# Patient Record
Sex: FEMALE | Race: ASIAN | Employment: OTHER
[De-identification: names, ages, dates, MRNs, and addresses within clinical notes are randomized per-mention and may not be internally consistent; named-entity substitution may affect disease eponyms.]

---

## 2017-08-12 ENCOUNTER — HEALTH MAINTENANCE LETTER (OUTPATIENT)
Age: 27
End: 2017-08-12

## 2022-03-07 ENCOUNTER — APPOINTMENT (OUTPATIENT)
Dept: GENERAL RADIOLOGY | Facility: CLINIC | Age: 32
End: 2022-03-07
Attending: EMERGENCY MEDICINE
Payer: COMMERCIAL

## 2022-03-07 ENCOUNTER — HOSPITAL ENCOUNTER (EMERGENCY)
Facility: CLINIC | Age: 32
Discharge: HOME | End: 2022-03-07
Attending: EMERGENCY MEDICINE | Admitting: EMERGENCY MEDICINE
Payer: COMMERCIAL

## 2022-03-07 VITALS
RESPIRATION RATE: 17 BRPM | OXYGEN SATURATION: 99 % | WEIGHT: 169 LBS | TEMPERATURE: 98.2 F | HEART RATE: 90 BPM | DIASTOLIC BLOOD PRESSURE: 68 MMHG | SYSTOLIC BLOOD PRESSURE: 120 MMHG

## 2022-03-07 DIAGNOSIS — R00.2 PALPITATIONS: ICD-10-CM

## 2022-03-07 DIAGNOSIS — R07.89 CHEST PRESSURE: ICD-10-CM

## 2022-03-07 LAB
ANION GAP SERPL CALCULATED.3IONS-SCNC: 6 MMOL/L (ref 3–14)
BASOPHILS # BLD AUTO: 0 10E3/UL (ref 0–0.2)
BASOPHILS NFR BLD AUTO: 0 %
BUN SERPL-MCNC: 12 MG/DL (ref 7–30)
CALCIUM SERPL-MCNC: 9.2 MG/DL (ref 8.5–10.1)
CHLORIDE BLD-SCNC: 108 MMOL/L (ref 94–109)
CO2 SERPL-SCNC: 27 MMOL/L (ref 20–32)
CREAT SERPL-MCNC: 0.6 MG/DL (ref 0.52–1.04)
D DIMER PPP FEU-MCNC: <0.27 UG/ML FEU (ref 0–0.5)
EOSINOPHIL # BLD AUTO: 0 10E3/UL (ref 0–0.7)
EOSINOPHIL NFR BLD AUTO: 0 %
ERYTHROCYTE [DISTWIDTH] IN BLOOD BY AUTOMATED COUNT: 12 % (ref 10–15)
GFR SERPL CREATININE-BSD FRML MDRD: >90 ML/MIN/1.73M2
GLUCOSE BLD-MCNC: 109 MG/DL (ref 70–99)
HCG UR QL: NEGATIVE
HCT VFR BLD AUTO: 39.5 % (ref 35–47)
HGB BLD-MCNC: 13.7 G/DL (ref 11.7–15.7)
IMM GRANULOCYTES # BLD: 0.1 10E3/UL
IMM GRANULOCYTES NFR BLD: 1 %
LYMPHOCYTES # BLD AUTO: 1.5 10E3/UL (ref 0.8–5.3)
LYMPHOCYTES NFR BLD AUTO: 12 %
MCH RBC QN AUTO: 28.9 PG (ref 26.5–33)
MCHC RBC AUTO-ENTMCNC: 34.7 G/DL (ref 31.5–36.5)
MCV RBC AUTO: 83 FL (ref 78–100)
MONOCYTES # BLD AUTO: 0.8 10E3/UL (ref 0–1.3)
MONOCYTES NFR BLD AUTO: 6 %
NEUTROPHILS # BLD AUTO: 10.8 10E3/UL (ref 1.6–8.3)
NEUTROPHILS NFR BLD AUTO: 81 %
NRBC # BLD AUTO: 0 10E3/UL
NRBC BLD AUTO-RTO: 0 /100
PLATELET # BLD AUTO: 231 10E3/UL (ref 150–450)
POTASSIUM BLD-SCNC: 3.6 MMOL/L (ref 3.4–5.3)
RBC # BLD AUTO: 4.74 10E6/UL (ref 3.8–5.2)
SODIUM SERPL-SCNC: 141 MMOL/L (ref 133–144)
TROPONIN I SERPL HS-MCNC: 4 NG/L
WBC # BLD AUTO: 13.2 10E3/UL (ref 4–11)

## 2022-03-07 PROCEDURE — 85025 COMPLETE CBC W/AUTO DIFF WBC: CPT | Performed by: EMERGENCY MEDICINE

## 2022-03-07 PROCEDURE — 99285 EMERGENCY DEPT VISIT HI MDM: CPT | Performed by: EMERGENCY MEDICINE

## 2022-03-07 PROCEDURE — 85379 FIBRIN DEGRADATION QUANT: CPT | Performed by: EMERGENCY MEDICINE

## 2022-03-07 PROCEDURE — 84484 ASSAY OF TROPONIN QUANT: CPT | Performed by: EMERGENCY MEDICINE

## 2022-03-07 PROCEDURE — 71046 X-RAY EXAM CHEST 2 VIEWS: CPT

## 2022-03-07 PROCEDURE — 80048 BASIC METABOLIC PNL TOTAL CA: CPT | Performed by: EMERGENCY MEDICINE

## 2022-03-07 PROCEDURE — 250N000013 HC RX MED GY IP 250 OP 250 PS 637: Performed by: EMERGENCY MEDICINE

## 2022-03-07 PROCEDURE — 99285 EMERGENCY DEPT VISIT HI MDM: CPT | Mod: 25 | Performed by: EMERGENCY MEDICINE

## 2022-03-07 PROCEDURE — 36415 COLL VENOUS BLD VENIPUNCTURE: CPT | Performed by: EMERGENCY MEDICINE

## 2022-03-07 PROCEDURE — 81025 URINE PREGNANCY TEST: CPT | Performed by: EMERGENCY MEDICINE

## 2022-03-07 PROCEDURE — 93005 ELECTROCARDIOGRAM TRACING: CPT | Performed by: EMERGENCY MEDICINE

## 2022-03-07 RX ORDER — ASPIRIN 81 MG/1
324 TABLET, CHEWABLE ORAL ONCE
Status: COMPLETED | OUTPATIENT
Start: 2022-03-07 | End: 2022-03-07

## 2022-03-07 RX ADMIN — ASPIRIN 81 MG CHEWABLE TABLET 324 MG: 81 TABLET CHEWABLE at 22:25

## 2022-03-08 ENCOUNTER — PATIENT OUTREACH (OUTPATIENT)
Dept: FAMILY MEDICINE | Facility: CLINIC | Age: 32
End: 2022-03-08
Payer: COMMERCIAL

## 2022-03-08 NOTE — ED PROVIDER NOTES
"     [facility] EMERGENCY DEPARTMENT ([facility])  3/07/22    History     Chief Complaint   Patient presents with     Medication Reaction     1.5 weeks ago was started on Concerta, Tues went to the clininc \" due to have weird feeling in my chest, EKG was normal\" Last took Concerta 3 days ago. Today again having racing heartbeat, feeling numb in my hands, hard time concentrating     HPI  Madeleine Daly is a 32 year old female who presents to the ED for evaluation of palpitations and chest pressure.  Patient thinks that this may be related to starting Concerta 1.5 weeks ago.  She states she has not taken the Concerta for the past 3 days as well as ceasing caffeine use.  She states she has felt slightly better, but is still experiencing intermittent palpitations, chest pressure, and lightheadedness.  Patient states that she has also had some hand numbness, nausea, and shortness of breath earlier today, but is unsure if the shortness of breath may be related to being anxious.  She denies current shortness of breath, nausea, or numbness.  She denies vomiting.  Patient states she is still experiencing the chest pressure and describes it as a tightness.  She denies recent COVID-19 infection, fever, chills, or cough.  She denies recent immobilization, leg pain or swelling, or family history of early onset cardiac disease.  She denies personal history of hypertension or high cholesterol.  She denies being a smoker.  Patient does indicate that she has had IUD in place.  She denies prior similar incidents.  She denies any known modifying factors to her chest pressure.    Past Medical History  Past Medical History:   Diagnosis Date     Depressive disorder, not elsewhere classified      Past Surgical History:   Procedure Laterality Date     NO HISTORY OF SURGERY       ALLEGRA 180 MG OR TABS  MIDRIN 325- MG OR CAPS  oseltamivir (TAMIFLU) 75 MG capsule  Desogestrel-Ethinyl Estradiol (DESOGEN PO)      No Known " Allergies  Family History  Family History   Problem Relation Age of Onset     C.A.D. Paternal Grandmother      C.A.D. Paternal Uncle         heart transplant     Diabetes Maternal Aunt      Diabetes Maternal Grandmother         borderline     Hypertension Maternal Grandfather         great     Hypertension Maternal Grandmother      Cerebrovascular Disease Maternal Grandmother         great     Cerebrovascular Disease Maternal Grandmother      Breast Cancer No family hx of      Cancer - colorectal No family hx of      Social History   Social History     Tobacco Use     Smoking status: Passive Smoke Exposure - Never Smoker     Smokeless tobacco: Not on file     Tobacco comment: not real often   Substance Use Topics     Alcohol use: Yes     Comment: not very often     Drug use: No      Past medical history, past surgical history, medications, allergies, family history, and social history were reviewed with the patient. No additional pertinent items.       Review of Systems  A complete review of systems was performed with pertinent positives and negatives noted in the HPI, and all other systems negative.    Physical Exam   BP: 132/81  Pulse: 100  Temp: 98.3  F (36.8  C)  Resp: 16  Weight: 76.7 kg (169 lb)  SpO2: 100 %  Physical Exam  \General: Alert,  sitting on the bed in NAD   Neuro: awake alert moving extremities x 4 face symetrical   Head: atraumatic   Eyes: palpebral conjunctiva normal - not pale   Mouth/Throat: mucous membranes moist    Chest/Pulm: clear BS bilaterally, no chest wall tenderness to palpation   Cardiovascular: regular; S1 S2 no murmur;  cap refill < 2secs to the digits   Abdomen:  soft non-tender +BS   Extremities: no LE edema, no calf tenderness   Skin: non diaphoretic  warm and dry     ED Course      Procedures   9:54 PM  The patient was seen and examined by Kasie Cabral MD in Room ED02.      Nursing note were reviewed.  Past Medical records were reviewed.  Patient has no cardiac risk factors.   Differential Diagnosis for this patient's chest pain includes ischemic chest pain pulmonary embolism aortic dissection pneumonia esophageal rupture pericarditis chest wall pain or referred pain from biliary colic.      Soon after presentation to the ER patient was placed on a cardiac monitor, provided oxygen via nasal canula and an EKG was ordered which showed sinus rhythm with no signs of acute ischemia or infarction.  In addition, an Aspirin was  Given.       I do not believe the patient's pain represents a PE.  The Wells criteria score is low.  Thus a d-dimer was done and was negative. (Patient could not be ruled out by PERC Criteria.   I do not believe this patient has a thoracic aortic dissection as the pain is not ripping or tearing it does not go to her back.  This patient does not have a history of poorly controlled high blood pressure.   I do not believe that her chest pain is secondary to pneumonia as this patient does not have productive cough or fever.  The CXR did not demonstrate infiltrates.  I do not believe her pain is due to esophageal rupture as there was no preceding  forceful vomitting or retching and the CXR does not show mediastinal free air.   I do not believe it is due to pericarditis as there is no evidence of [place] depression on the electrocardiogram and the pain does not seem to be markedly changed with position.  I do not believe that this patient has referred pain from the ABD as her ABD exam is benign.  Patient's cardiac risk score is  low. at this point I believe the most likely source of this patient's pain is unclear.   Discussed findings with patient and that she should follow up closely in primary care clinic. Patient is in agreement with plan.    Patient discharged in good condition with questions answered. She was given epic discharge instructions and follow-up recommendations. Patient will return to the ED if symptoms worsen or She develops any of the concerning signs/symptoms as  outlined in the EPIC d/c instructions. Otherwise she will follow up in clinic as recommended in the d/c instructions.       Results for orders placed or performed during the hospital encounter of 03/07/22   CBC with platelets and differential     Status: Abnormal   Result Value Ref Range    WBC Count 13.2 (H) 4.0 - 11.0 10e3/uL    RBC Count 4.74 3.80 - 5.20 10e6/uL    Hemoglobin 13.7 11.7 - 15.7 g/dL    Hematocrit 39.5 35.0 - 47.0 %    MCV 83 78 - 100 fL    MCH 28.9 26.5 - 33.0 pg    MCHC 34.7 31.5 - 36.5 g/dL    RDW 12.0 10.0 - 15.0 %    Platelet Count 231 150 - 450 10e3/uL    % Neutrophils 81 %    % Lymphocytes 12 %    % Monocytes 6 %    % Eosinophils 0 %    % Basophils 0 %    % Immature Granulocytes 1 %    NRBCs per 100 WBC 0 <1 /100    Absolute Neutrophils 10.8 (H) 1.6 - 8.3 10e3/uL    Absolute Lymphocytes 1.5 0.8 - 5.3 10e3/uL    Absolute Monocytes 0.8 0.0 - 1.3 10e3/uL    Absolute Eosinophils 0.0 0.0 - 0.7 10e3/uL    Absolute Basophils 0.0 0.0 - 0.2 10e3/uL    Absolute Immature Granulocytes 0.1 <=0.4 10e3/uL    Absolute NRBCs 0.0 10e3/uL   HCG qualitative urine     Status: Normal   Result Value Ref Range    hCG Urine Qualitative Negative Negative   CBC with platelets differential     Status: Abnormal    Narrative    The following orders were created for panel order CBC with platelets differential.  Procedure                               Abnormality         Status                     ---------                               -----------         ------                     CBC with platelets and d...[[id number]]  Abnormal            Final result                 Please view results for these tests on the individual orders.     Medications   aspirin (ASA) chewable tablet 324 mg (324 mg Oral Given 3/7/22 5710)        Assessments & Plan (with Medical Decision Making)   See MDM above    Assessment:  Chest Pressure  Palpitations    Plan:  Discharge to home with close follow up in Primary care clinic  Return to the ER  with any worsening symptoms      I have reviewed the nursing notes. I have reviewed the findings, diagnosis, plan and need for follow up with the patient.    New Prescriptions    No medications on file       Final diagnoses:   None     I, Austin Segovia, am serving as a trained medical scribe to document services personally performed by Kasie Cabral MD, based on the provider's statements to me.     IKasie MD, was physically present and have reviewed and verified the accuracy of this note documented by Austin Segovia.    --  Kasie Cabral MD  [facility] EMERGENCY DEPARTMENT  3/7/2022     Kasie Cabral MD  03/07/22 [id number]

## 2022-03-08 NOTE — ED TRIAGE NOTES
"Pt anxious, concerned about chest pressure, per pt she also used THC \" but I've never felt this way before. I just want to be taken seriously and making sure there is nothing wrong with me\"  Recently started on Cencerta 1.5 weeks ago, last took the med 3 days ago  "

## 2022-03-08 NOTE — TELEPHONE ENCOUNTER
Patient's PCP is with [facility].    No outreach attempt will be made by triage.    JOE Berumen, RN  [facility]

## 2022-03-09 LAB
ATRIAL RATE - MUSE: 101 BPM
DIASTOLIC BLOOD PRESSURE - MUSE: NORMAL MMHG
INTERPRETATION ECG - MUSE: NORMAL
P AXIS - MUSE: 84 DEGREES
PR INTERVAL - MUSE: 190 MS
QRS DURATION - MUSE: 84 MS
QT - MUSE: 330 MS
QTC - MUSE: 427 MS
R AXIS - MUSE: 74 DEGREES
SYSTOLIC BLOOD PRESSURE - MUSE: NORMAL MMHG
T AXIS - MUSE: 80 DEGREES
VENTRICULAR RATE- MUSE: 101 BPM

## 2022-05-01 ENCOUNTER — HEALTH MAINTENANCE LETTER (OUTPATIENT)
Age: 32
End: 2022-05-01

## 2022-11-21 ENCOUNTER — HEALTH MAINTENANCE LETTER (OUTPATIENT)
Age: 32
End: 2022-11-21

## 2023-06-02 ENCOUNTER — HEALTH MAINTENANCE LETTER (OUTPATIENT)
Age: 33
End: 2023-06-02

## 2024-06-23 ENCOUNTER — HEALTH MAINTENANCE LETTER (OUTPATIENT)
Age: 34
End: 2024-06-23

## 2025-06-18 NOTE — DISCHARGE INSTRUCTIONS
Thank you for choosing [facility] for your care. It was a pleasure taking care of you today in our Emergency Department.     Please follow up with your primary care provider in the next 3-5 days. However, if you have continued worsening symptoms, please return to the emergency department.      [employer] Hearing screening results were discussed with parent. Questions answered. Brochure given to parent. Advised to monitor developmental milestones and contact physician for any concerns.   Nila Lyons, AuD